# Patient Record
Sex: MALE | Race: ASIAN | NOT HISPANIC OR LATINO | URBAN - METROPOLITAN AREA
[De-identification: names, ages, dates, MRNs, and addresses within clinical notes are randomized per-mention and may not be internally consistent; named-entity substitution may affect disease eponyms.]

---

## 2022-03-08 ENCOUNTER — LAB OUTSIDE AN ENCOUNTER (OUTPATIENT)
Dept: URBAN - METROPOLITAN AREA CLINIC 35 | Facility: CLINIC | Age: 67
End: 2022-03-08

## 2022-03-08 ENCOUNTER — OFFICE VISIT (OUTPATIENT)
Dept: URBAN - METROPOLITAN AREA CLINIC 35 | Facility: CLINIC | Age: 67
End: 2022-03-08
Payer: MEDICARE

## 2022-03-08 VITALS
SYSTOLIC BLOOD PRESSURE: 128 MMHG | WEIGHT: 123 LBS | DIASTOLIC BLOOD PRESSURE: 82 MMHG | HEIGHT: 68 IN | BODY MASS INDEX: 18.64 KG/M2 | HEART RATE: 77 BPM | OXYGEN SATURATION: 98 %

## 2022-03-08 DIAGNOSIS — E61.1 IRON DEFICIENCY: ICD-10-CM

## 2022-03-08 DIAGNOSIS — Z12.11 ENCOUNTER FOR SCREENING FOR MALIGNANT NEOPLASM OF COLON: ICD-10-CM

## 2022-03-08 PROCEDURE — 99204 OFFICE O/P NEW MOD 45 MIN: CPT | Performed by: INTERNAL MEDICINE

## 2022-03-08 RX ORDER — IRON FUM,PS CMP/VIT C/NIACIN 125-40-3MG
1 CAPSULE BETWEEN MEALS CAPSULE ORAL ONCE A DAY
Qty: 30 | Refills: 2 | OUTPATIENT
Start: 2022-03-08

## 2022-03-08 RX ORDER — SODIUM PICOSULFATE, MAGNESIUM OXIDE, AND ANHYDROUS CITRIC ACID 10; 3.5; 12 MG/160ML; G/160ML; G/160ML
160 ML LIQUID ORAL
Qty: 320 MILLILITER | Refills: 0 | OUTPATIENT
Start: 2022-03-08 | End: 2022-03-10

## 2022-03-08 RX ORDER — GEMFIBROZIL 600 MG/1
TAKE 1 TABLET (600 MG) BY ORAL ROUTE 2 TIMES PER DAY 30 MINUTES BEFORE MORNING AND EVENING MEAL TABLET, FILM COATED ORAL 2
Qty: 0 | Refills: 0 | Status: ACTIVE | COMMUNITY
Start: 1900-01-01

## 2022-03-08 RX ORDER — LISINOPRIL 5 MG/1
TABLET ORAL
Qty: 0 | Refills: 0 | Status: ACTIVE | COMMUNITY
Start: 1900-01-01

## 2022-03-08 RX ORDER — ATORVASTATIN CALCIUM 10 MG/1
TABLET, FILM COATED ORAL
Qty: 0 | Refills: 0 | Status: ACTIVE | COMMUNITY
Start: 1900-01-01

## 2022-03-08 RX ORDER — METFORMIN HYDROCHLORIDE 1000 MG/1
1 TABLET WITH A MEAL TABLET, FILM COATED ORAL ONCE A DAY
Status: ACTIVE | COMMUNITY

## 2022-03-08 NOTE — HPI-ANEMIA
66 years old male patient presents for Anemia consult . Patient was first diagnosed 03/03/22 by Dr._Suresh Quevedo, and the most recent labs were taken 03/2/22.  Patient currently states that they are not taking any iron supplements and that they have not had a transfusion.  Patient denies NSAID use, a history of GI bleeding, any blood or melena in stools, epigastric/abdominal pains, cold extremities, light headedness, dizziness. He does admit fatigue . Patient states that they have had an EGD before maybe 3 years ago.   Pt denies colonoscopy was completed .    Outside Records :  03/2022 CBC: Hgb is LOWat 13.1 , All other values normal  Ferratin is LOW at 12  10/2021 CBC: Hgb is LOW at 12.3, Hct is LOWat 38.3, All other values within normal range .  07/2021 CBC: Hgb is LOW at 12.3, 37.7 , All other values normal .

## 2022-03-24 ENCOUNTER — CLAIMS CREATED FROM THE CLAIM WINDOW (OUTPATIENT)
Dept: URBAN - METROPOLITAN AREA CLINIC 4 | Facility: CLINIC | Age: 67
End: 2022-03-24
Payer: MEDICARE

## 2022-03-24 ENCOUNTER — OFFICE VISIT (OUTPATIENT)
Dept: URBAN - METROPOLITAN AREA SURGERY CENTER 8 | Facility: SURGERY CENTER | Age: 67
End: 2022-03-24

## 2022-03-24 ENCOUNTER — CLAIMS CREATED FROM THE CLAIM WINDOW (OUTPATIENT)
Dept: URBAN - METROPOLITAN AREA SURGERY CENTER 8 | Facility: SURGERY CENTER | Age: 67
End: 2022-03-24
Payer: MEDICARE

## 2022-03-24 DIAGNOSIS — Z53.8 FAILED ATTEMPTED SURGICAL PROCEDURE: ICD-10-CM

## 2022-03-24 DIAGNOSIS — K31.89 FOCAL FOVEOLAR HYPERPLASIA: ICD-10-CM

## 2022-03-24 DIAGNOSIS — D50.9 ANEMIA: ICD-10-CM

## 2022-03-24 DIAGNOSIS — Z12.11 COLON CANCER SCREENING: ICD-10-CM

## 2022-03-24 DIAGNOSIS — K90.0 CELIAC DISEASE: ICD-10-CM

## 2022-03-24 DIAGNOSIS — K31.89 ACQUIRED DEFORMITY OF DUODENUM: ICD-10-CM

## 2022-03-24 PROCEDURE — G0121 COLON CA SCRN NOT HI RSK IND: HCPCS | Performed by: INTERNAL MEDICINE

## 2022-03-24 PROCEDURE — G8907 PT DOC NO EVENTS ON DISCHARG: HCPCS | Performed by: INTERNAL MEDICINE

## 2022-03-24 PROCEDURE — 88342 IMHCHEM/IMCYTCHM 1ST ANTB: CPT | Performed by: PATHOLOGY

## 2022-03-24 PROCEDURE — 88305 TISSUE EXAM BY PATHOLOGIST: CPT | Performed by: PATHOLOGY

## 2022-03-24 PROCEDURE — 43239 EGD BIOPSY SINGLE/MULTIPLE: CPT | Performed by: INTERNAL MEDICINE

## 2022-04-14 ENCOUNTER — OFFICE VISIT (OUTPATIENT)
Dept: URBAN - METROPOLITAN AREA CLINIC 35 | Facility: CLINIC | Age: 67
End: 2022-04-14
Payer: MEDICARE

## 2022-04-14 VITALS
SYSTOLIC BLOOD PRESSURE: 112 MMHG | BODY MASS INDEX: 18.34 KG/M2 | WEIGHT: 121 LBS | HEART RATE: 80 BPM | HEIGHT: 68 IN | OXYGEN SATURATION: 98 % | DIASTOLIC BLOOD PRESSURE: 68 MMHG

## 2022-04-14 DIAGNOSIS — Z12.11 COLON CANCER SCREENING: ICD-10-CM

## 2022-04-14 DIAGNOSIS — E61.1 IRON DEFICIENCY: ICD-10-CM

## 2022-04-14 PROCEDURE — 99213 OFFICE O/P EST LOW 20 MIN: CPT | Performed by: INTERNAL MEDICINE

## 2022-04-14 RX ORDER — METFORMIN HYDROCHLORIDE 1000 MG/1
1 TABLET WITH A MEAL TABLET, FILM COATED ORAL ONCE A DAY
Status: ACTIVE | COMMUNITY

## 2022-04-14 RX ORDER — IRON FUM,PS CMP/VIT C/NIACIN 125-40-3MG
1 CAPSULE BETWEEN MEALS CAPSULE ORAL ONCE A DAY
Qty: 30 | Refills: 2 | Status: ACTIVE | COMMUNITY
Start: 2022-03-08

## 2022-04-14 RX ORDER — LISINOPRIL 5 MG/1
TABLET ORAL
Qty: 0 | Refills: 0 | Status: ACTIVE | COMMUNITY
Start: 1900-01-01

## 2022-04-14 RX ORDER — ATORVASTATIN CALCIUM 10 MG/1
TABLET, FILM COATED ORAL
Qty: 0 | Refills: 0 | Status: ACTIVE | COMMUNITY
Start: 1900-01-01

## 2022-04-14 RX ORDER — GEMFIBROZIL 600 MG/1
TAKE 1 TABLET (600 MG) BY ORAL ROUTE 2 TIMES PER DAY 30 MINUTES BEFORE MORNING AND EVENING MEAL TABLET, FILM COATED ORAL 2
Qty: 0 | Refills: 0 | Status: ACTIVE | COMMUNITY
Start: 1900-01-01

## 2022-04-14 NOTE — HPI-COLONOSCOPY FOLLOWUP
Patient presents for follow up of colonoscopy . Patient had no biopsies taken and colon was poorly prepped. Patient denies any complications . He does mention he thinks Integra may be contributing to Constipation.  He admits  1 bowel movement a day with straining .

## 2022-04-14 NOTE — HPI-ANEMIA
Terry presents for follow up of Anemia and EGD  Patient had EGD completed on 03/24 with results documented . Pt denies complications after procedure . He admits continuing his Integra .  Pt denies nausea or vomiting .      Of last visit (03/08/2022) 66 years old male patient presents for Anemia consult . Patient was first diagnosed 03/03/22 by Dr._Suresh Quevedo, and the most recent labs were taken 03/2/22.  Patient currently states that they are not taking any iron supplements and that they have not had a transfusion.  Patient denies NSAID use, a history of GI bleeding, any blood or melena in stools, epigastric/abdominal pains, cold extremities, light headedness, dizziness. He does admit fatigue . Patient states that they have had an EGD before maybe 3 years ago.   Pt denies colonoscopy was completed .    Outside Records :  03/2022 CBC: Hgb is LOWat 13.1 , All other values normal  Ferratin is LOW at 12  10/2021 CBC: Hgb is LOW at 12.3, Hct is LOWat 38.3, All other values within normal range .  07/2021 CBC: Hgb is LOW at 12.3, 37.7 , All other values normal .

## 2022-06-16 ENCOUNTER — LAB OUTSIDE AN ENCOUNTER (OUTPATIENT)
Dept: URBAN - METROPOLITAN AREA CLINIC 35 | Facility: CLINIC | Age: 67
End: 2022-06-16

## 2022-06-16 ENCOUNTER — OFFICE VISIT (OUTPATIENT)
Dept: URBAN - METROPOLITAN AREA CLINIC 35 | Facility: CLINIC | Age: 67
End: 2022-06-16
Payer: MEDICARE

## 2022-06-16 VITALS
BODY MASS INDEX: 18.79 KG/M2 | HEIGHT: 68 IN | DIASTOLIC BLOOD PRESSURE: 78 MMHG | WEIGHT: 124 LBS | SYSTOLIC BLOOD PRESSURE: 128 MMHG

## 2022-06-16 DIAGNOSIS — R10.13 EPIGASTRIC PAIN: ICD-10-CM

## 2022-06-16 DIAGNOSIS — Z12.11 COLON CANCER SCREENING: ICD-10-CM

## 2022-06-16 DIAGNOSIS — E61.1 IRON DEFICIENCY: ICD-10-CM

## 2022-06-16 PROBLEM — 35240004: Status: ACTIVE | Noted: 2022-03-08

## 2022-06-16 PROCEDURE — 99214 OFFICE O/P EST MOD 30 MIN: CPT | Performed by: INTERNAL MEDICINE

## 2022-06-16 RX ORDER — GEMFIBROZIL 600 MG/1
TAKE 1 TABLET (600 MG) BY ORAL ROUTE 2 TIMES PER DAY 30 MINUTES BEFORE MORNING AND EVENING MEAL TABLET, FILM COATED ORAL 2
Qty: 0 | Refills: 0 | Status: ACTIVE | COMMUNITY
Start: 1900-01-01

## 2022-06-16 RX ORDER — IRON FUM,PS CMP/VIT C/NIACIN 125-40-3MG
1 CAPSULE BETWEEN MEALS CAPSULE ORAL ONCE A DAY
Qty: 90 | Refills: 1 | OUTPATIENT
Start: 2022-06-16

## 2022-06-16 RX ORDER — IRON FUM,PS CMP/VIT C/NIACIN 125-40-3MG
1 CAPSULE BETWEEN MEALS CAPSULE ORAL ONCE A DAY
Qty: 30 | Refills: 2 | Status: ACTIVE | COMMUNITY
Start: 2022-03-08

## 2022-06-16 RX ORDER — METFORMIN HYDROCHLORIDE 1000 MG/1
1 TABLET WITH A MEAL TABLET, FILM COATED ORAL ONCE A DAY
Status: ACTIVE | COMMUNITY

## 2022-06-16 RX ORDER — LISINOPRIL 5 MG/1
TABLET ORAL
Qty: 0 | Refills: 0 | Status: ACTIVE | COMMUNITY
Start: 1900-01-01

## 2022-06-16 RX ORDER — ATORVASTATIN CALCIUM 10 MG/1
TABLET, FILM COATED ORAL
Qty: 0 | Refills: 0 | Status: ACTIVE | COMMUNITY
Start: 1900-01-01

## 2022-06-16 NOTE — HPI-ANEMIA
Patient present for follow up of labs and Anemia . He admits continuing Integra daily . He denies any abdominal pain, nausea , SOB , easy bruising or fatigue .    06/13/2022)  CBC: Hgb is LOW at 12.9, All other values normal  Ferritin  is LOW at 23  Iron and TIBC is normal .    Of last visit(04/14/2022)  Patint presents for follow up of Anemia and EGD  Patient had EGD completed on 03/24 with results documented . Pt denies complications after procedure . He admits continuing his Integra .  Pt denies nausea or vomiting .      Of last visit (03/08/2022) 66 years old male patient presents for Anemia consult . Patient was first diagnosed 03/03/22 by Dr._Suresh Quevedo, and the most recent labs were taken 03/2/22.  Patient currently states that they are not taking any iron supplements and that they have not had a transfusion.  Patient denies NSAID use, a history of GI bleeding, any blood or melena in stools, epigastric/abdominal pains, cold extremities, light headedness, dizziness. He does admit fatigue . Patient states that they have had an EGD before maybe 3 years ago.   Pt denies colonoscopy was completed .    Outside Records :  03/2022 CBC: Hgb is LOWat 13.1 , All other values normal  Ferratin is LOW at 12  10/2021 CBC: Hgb is LOW at 12.3, Hct is LOWat 38.3, All other values within normal range .  07/2021 CBC: Hgb is LOW at 12.3, 37.7 , All other values normal .

## 2022-07-07 ENCOUNTER — DASHBOARD ENCOUNTERS (OUTPATIENT)
Age: 67
End: 2022-07-07

## 2022-07-07 ENCOUNTER — OFFICE VISIT (OUTPATIENT)
Dept: URBAN - METROPOLITAN AREA CLINIC 35 | Facility: CLINIC | Age: 67
End: 2022-07-07
Payer: MEDICARE

## 2022-07-07 VITALS
DIASTOLIC BLOOD PRESSURE: 70 MMHG | BODY MASS INDEX: 17.88 KG/M2 | SYSTOLIC BLOOD PRESSURE: 120 MMHG | WEIGHT: 118 LBS | HEIGHT: 68 IN | HEART RATE: 69 BPM | OXYGEN SATURATION: 98 %

## 2022-07-07 DIAGNOSIS — E61.1 IRON DEFICIENCY: ICD-10-CM

## 2022-07-07 DIAGNOSIS — R11.11 VOMITING WITHOUT NAUSEA, UNSPECIFIED VOMITING TYPE: ICD-10-CM

## 2022-07-07 DIAGNOSIS — R10.13 EPIGASTRIC PAIN: ICD-10-CM

## 2022-07-07 PROBLEM — 79922009: Status: ACTIVE | Noted: 2022-06-16

## 2022-07-07 PROCEDURE — 99212 OFFICE O/P EST SF 10 MIN: CPT | Performed by: INTERNAL MEDICINE

## 2022-07-07 RX ORDER — IRON FUM,PS CMP/VIT C/NIACIN 125-40-3MG
1 CAPSULE BETWEEN MEALS CAPSULE ORAL ONCE A DAY
Qty: 90 | Refills: 1 | Status: ACTIVE | COMMUNITY
Start: 2022-06-16

## 2022-07-07 RX ORDER — METFORMIN HYDROCHLORIDE 1000 MG/1
1 TABLET WITH A MEAL TABLET, FILM COATED ORAL ONCE A DAY
Status: ACTIVE | COMMUNITY

## 2022-07-07 RX ORDER — LISINOPRIL 5 MG/1
TABLET ORAL
Qty: 0 | Refills: 0 | Status: ACTIVE | COMMUNITY
Start: 1900-01-01

## 2022-07-07 RX ORDER — IRON FUM,PS CMP/VIT C/NIACIN 125-40-3MG
1 CAPSULE BETWEEN MEALS CAPSULE ORAL ONCE A DAY
Qty: 30 | Refills: 2 | Status: ACTIVE | COMMUNITY
Start: 2022-03-08

## 2022-07-07 RX ORDER — GEMFIBROZIL 600 MG/1
TAKE 1 TABLET (600 MG) BY ORAL ROUTE 2 TIMES PER DAY 30 MINUTES BEFORE MORNING AND EVENING MEAL TABLET, FILM COATED ORAL 2
Qty: 0 | Refills: 0 | Status: ACTIVE | COMMUNITY
Start: 1900-01-01

## 2022-07-07 RX ORDER — ATORVASTATIN CALCIUM 10 MG/1
TABLET, FILM COATED ORAL
Qty: 0 | Refills: 0 | Status: ACTIVE | COMMUNITY
Start: 1900-01-01

## 2022-07-07 NOTE — HPI-ANEMIA
Patient presents for anemia follow up. Patient had labs and imaging completed with results documented. Patient admits any current symptoms, the symptoms that he is experiencing is fatigue, he is also not eating because he states that he is afraid to vomit.  He denies continuing Integra since the last fourth of July, he has not been taking Integra.     Of last visit (06/16/2022) Patient present for follow up of labs and Anemia . He admits continuing Integra daily . He denies any abdominal pain, nausea , SOB , easy bruising or fatigue .    06/13/2022)  CBC: Hgb is LOW at 12.9, All other values normal  Ferritin  is LOW at 23  Iron and TIBC is normal .    Of last visit(04/14/2022)  Patint presents for follow up of Anemia and EGD  Patient had EGD completed on 03/24 with results documented . Pt denies complications after procedure . He admits continuing his Integra .  Pt denies nausea or vomiting .      Of last visit (03/08/2022) 66 years old male patient presents for Anemia consult . Patient was first diagnosed 03/03/22 by Dr._Suresh Quevedo, and the most recent labs were taken 03/2/22.  Patient currently states that they are not taking any iron supplements and that they have not had a transfusion.  Patient denies NSAID use, a history of GI bleeding, any blood or melena in stools, epigastric/abdominal pains, cold extremities, light headedness, dizziness. He does admit fatigue . Patient states that they have had an EGD before maybe 3 years ago.   Pt denies colonoscopy was completed .    Outside Records :  03/2022 CBC: Hgb is LOWat 13.1 , All other values normal  Ferratin is LOW at 12  10/2021 CBC: Hgb is LOW at 12.3, Hct is LOWat 38.3, All other values within normal range .  07/2021 CBC: Hgb is LOW at 12.3, 37.7 , All other values normal .